# Patient Record
(demographics unavailable — no encounter records)

---

## 2025-03-07 NOTE — PHYSICAL EXAM
[de-identified] :  The patient appears well nourished and in no apparent distress. The patient is alert and oriented to person, place, and time. Affect and mood appear normal. The head is normocephalic and atraumatic. The eyes reveal normal sclera and extra ocular muscles are intact. The tongue is midline with no apparent lesions. Skin shows normal turgor with no evidence of eczema or psoriasis. No respiratory distress noted. Sensation grossly intact. [de-identified] :  Exam of the left knee shows 0 to 125 degrees of flexion measured with a goniometer. He is nontender over the proximal tibia medially. There is no medial joint line tenderness. 5/5 motor strength bilaterally distally. Sensation intact distally. [de-identified] : X-ray: 4 views of the left knee demonstrate well preserved joint space

## 2025-03-07 NOTE — DISCUSSION/SUMMARY
[de-identified] :  TRISTAN KEARNEY is a 76 year old male who presents with left knee probable subacute MCL sprain. Since he is no pain today, no further treatment is necessary as the sprain has healed. He will follow up as needed

## 2025-03-07 NOTE — CONSULT LETTER
Event Note [Dear  ___] : Dear  [unfilled], [Consult Letter:] : I had the pleasure of evaluating your patient, [unfilled]. [Please see my note below.] : Please see my note below. [Consult Closing:] : Thank you very much for allowing me to participate in the care of this patient.  If you have any questions, please do not hesitate to contact me. [Sincerely,] : Sincerely, [FreeTextEntry2] : KAILA DAVILA MD [FreeTextEntry3] : Ryder Diaz MD Chief of Joint Replacement Primary & Revision Hip and Knee Replacement  [facility] [place]

## 2025-03-07 NOTE — HISTORY OF PRESENT ILLNESS
[de-identified] : Mr. TRISTAN KEARNEY is a 76 year old male presenting for evaluation of a few weeks of left knee pain, now resolved. He fell at home on 1/20/25, resulting in three fractured toes, as well as an ankle sprain. his knee began to hurt one week after this. The pain was localized anteromedially. Pain was worse with weightbearing activity. He utilized ice and elevation as well as NSAIDs. Now his knee pain is resolved.

## 2025-07-14 NOTE — HISTORY OF PRESENT ILLNESS
[FreeTextEntry1] : Patient of [place] with h/o bladder cancer; now s/p cystectomy and [place] pouch 8/18 following neoadjuvant chemotherapy. He is CIC ~every 4 hours.; 5 at night with no leakage. urine clear and no hematuria. No other issues; regained some weight but happier lighter.  had UTI last month - had chills then next day N/V and pain. Admitted to [facility]. Urine grew Klebsiella and Enterococcus. F/u Enterococcus - all treated with Cipro. he also notes that his abdominal hernias are larger and more bothersome. No N/V. cathing OK. No gross hematuria.   CT Scan last year no evidence of metastasis -  left hydro by no change in nephrogram all the way to the pouch. Contrast excreted but barelyy waited to enter ouch and pouch full. Has small bladder stones (1.3cm). One of the 3 hernia has bowel in it.  now S/P open reimplant and hernia repair with GS (mesh). 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 doing Ok - sometimes has issues getting cath in if ouch too full.   here for f/u - delayed due to COVID. Doing well with no issues. Occasional "mesh' pain. CIC ok - every 3-4 hours and 5 hours night. No infections.  CT in 7/20 am - not read but reviewed myself. No hydro - equal nephrograms and excretion. No obvious mets.   Due to Covid opted for TEB 7/21. Doing well with no issues. CIC every 4-5 hours with no leakage. wakes him up at night with sensation. occasionally has mucus bild up.  No hematuria or symptoms C/W UTI.  7/22 TEB visit doing well. CIC every 4 hiours and 5-6 hours at night. No leakage or hematuria   7/33 - opted for TEB 5 years from Cystectomy  no issues with cathing, rare minimal leakage if sleeps more than 6 hours. some mucus but no hematuria   7/24 - opted for TEB visit. doing weel: caths 4 hours day and 0-1 at night. rare leakage, no UTIs or hematuria.   7/25 opted for TEB noting ne: caths 4 times a day and less at night. no hematyria or UTIs CT negative